# Patient Record
Sex: FEMALE | Race: WHITE | NOT HISPANIC OR LATINO | URBAN - METROPOLITAN AREA
[De-identification: names, ages, dates, MRNs, and addresses within clinical notes are randomized per-mention and may not be internally consistent; named-entity substitution may affect disease eponyms.]

---

## 2023-04-05 ENCOUNTER — OFFICE VISIT (OUTPATIENT)
Dept: URGENT CARE | Facility: CLINIC | Age: 18
End: 2023-04-05
Payer: COMMERCIAL

## 2023-04-05 VITALS
OXYGEN SATURATION: 98 % | HEIGHT: 65 IN | DIASTOLIC BLOOD PRESSURE: 72 MMHG | WEIGHT: 205 LBS | HEART RATE: 91 BPM | RESPIRATION RATE: 16 BRPM | SYSTOLIC BLOOD PRESSURE: 108 MMHG | TEMPERATURE: 99 F | BODY MASS INDEX: 34.16 KG/M2

## 2023-04-05 DIAGNOSIS — J06.9 UPPER RESPIRATORY TRACT INFECTION, UNSPECIFIED TYPE: ICD-10-CM

## 2023-04-05 DIAGNOSIS — R09.81 NASAL CONGESTION: ICD-10-CM

## 2023-04-05 DIAGNOSIS — R05.9 COUGH, UNSPECIFIED TYPE: ICD-10-CM

## 2023-04-05 DIAGNOSIS — R52 GENERALIZED BODY ACHES: Primary | ICD-10-CM

## 2023-04-05 LAB
CTP QC/QA: YES
CTP QC/QA: YES
FLUAV AG NPH QL: NEGATIVE
FLUBV AG NPH QL: NEGATIVE
SARS-COV-2 RDRP RESP QL NAA+PROBE: NEGATIVE

## 2023-04-05 PROCEDURE — 99203 OFFICE O/P NEW LOW 30 MIN: CPT | Mod: S$PBB,,, | Performed by: FAMILY MEDICINE

## 2023-04-05 PROCEDURE — 87635 SARS-COV-2 COVID-19 AMP PRB: CPT | Mod: PBBFAC | Performed by: FAMILY MEDICINE

## 2023-04-05 PROCEDURE — 99204 OFFICE O/P NEW MOD 45 MIN: CPT | Mod: PBBFAC | Performed by: FAMILY MEDICINE

## 2023-04-05 PROCEDURE — 87804 INFLUENZA ASSAY W/OPTIC: CPT | Mod: PBBFAC | Performed by: FAMILY MEDICINE

## 2023-04-05 PROCEDURE — 99203 PR OFFICE/OUTPT VISIT, NEW, LEVL III, 30-44 MIN: ICD-10-PCS | Mod: S$PBB,,, | Performed by: FAMILY MEDICINE

## 2023-04-05 RX ORDER — DEXTROAMPHETAMINE SACCHARATE, AMPHETAMINE ASPARTATE, DEXTROAMPHETAMINE SULFATE AND AMPHETAMINE SULFATE 5; 5; 5; 5 MG/1; MG/1; MG/1; MG/1
1 TABLET ORAL
COMMUNITY
Start: 2022-10-13

## 2023-04-05 RX ORDER — NORGESTIMATE AND ETHINYL ESTRADIOL 0.25-0.035
1 KIT ORAL
COMMUNITY
Start: 2023-02-27

## 2023-04-05 NOTE — LETTER
April 5, 2023      [facility]  [address] [address]  Phone: [phone]       Patient: Glenna Chen   YOB: 2005  Date of Visit: 04/05/2023    To Whom It May Concern:    Carlos Chen  was at [facility] on 04/05/2023. The patient may return to work/school on 4/7/2023 with no restrictions. If you have any questions or concerns, or if I can be of further assistance, please do not hesitate to contact me.    Sincerely,    PRIYANKA Melendez MD

## 2023-04-06 NOTE — PROGRESS NOTES
"Subjective:      Patient ID: Glenna Chen is a 17 y.o. female.    Vitals:  height is 5' 4.96" (1.65 m) and weight is 93 kg (205 lb). Her oral temperature is 98.6 °F (37 °C). Her blood pressure is 108/72 and her pulse is 91. Her respiration is 16 and oxygen saturation is 98%.     Chief Complaint: Nausea, Nasal Congestion, Generalized Body Aches, and Cough    Cough  The current episode started yesterday. Associated symptoms include nasal congestion and postnasal drip. Pertinent negatives include no fever, rash, sore throat, shortness of breath or wheezing.     Constitution: Negative for fever.   HENT:  Positive for postnasal drip. Negative for ear discharge, drooling, facial swelling, sinus pain, sore throat and trouble swallowing.    Cardiovascular:  Negative for sob on exertion.   Eyes:  Negative for eye pain.   Respiratory:  Positive for cough. Negative for shortness of breath and wheezing.    Gastrointestinal:  Positive for nausea. Negative for abdominal pain, vomiting and diarrhea.   Skin:  Negative for rash.    Objective:     Physical Exam   Constitutional: She appears well-developed.  Non-toxic appearance. She does not appear ill. No distress.   HENT:   Head: Atraumatic.   Nose: No purulent discharge. Right sinus exhibits no maxillary sinus tenderness and no frontal sinus tenderness. Left sinus exhibits no maxillary sinus tenderness and no frontal sinus tenderness.   Mouth/Throat: No oropharyngeal exudate or posterior oropharyngeal erythema.   Eyes: Right eye exhibits no discharge. Left eye exhibits no discharge. Extraocular movement intact   Neck: Neck supple.   Cardiovascular: Regular rhythm.   Pulmonary/Chest: Effort normal and breath sounds normal. No respiratory distress. She has no wheezes. She has no rales.   Lymphadenopathy:     She has no cervical adenopathy.   Neurological: She is alert.   Skin: Skin is warm, dry and not diaphoretic.   Psychiatric: Her behavior is normal.   Nursing note and vitals " reviewed.  Results for orders placed or performed in visit on 04/05/23   POCT COVID-19 Rapid Screening   Result Value Ref Range    POC Rapid COVID Negative Negative     Acceptable Yes    POCT Influenza A/B   Result Value Ref Range    Rapid Influenza A Ag Negative Negative    Rapid Influenza B Ag Negative Negative     Acceptable Yes        Assessment:     1. Generalized body aches    2. Cough, unspecified type    3. Nasal congestion    4. Upper respiratory tract infection, unspecified type        Plan:       Generalized body aches  -     POCT COVID-19 Rapid Screening  -     POCT Influenza A/B    Cough, unspecified type  -     POCT COVID-19 Rapid Screening  -     POCT Influenza A/B    Nasal congestion  -     POCT COVID-19 Rapid Screening  -     POCT Influenza A/B    Upper respiratory tract infection, unspecified type      Please read patient education material and follow COVID-19 precautions.  Consider repeat COVID testing if symptoms worsen.    Use over-the-counter medications for symptom management.    Return to urgent care if current symptoms are not improving in 2-3 days, immediately if new or worsening symptoms develop.